# Patient Record
Sex: MALE | Race: WHITE | NOT HISPANIC OR LATINO | URBAN - METROPOLITAN AREA
[De-identification: names, ages, dates, MRNs, and addresses within clinical notes are randomized per-mention and may not be internally consistent; named-entity substitution may affect disease eponyms.]

---

## 2019-02-12 ENCOUNTER — EMERGENCY (EMERGENCY)
Facility: HOSPITAL | Age: 79
LOS: 1 days | Discharge: HOME | End: 2019-02-12
Attending: EMERGENCY MEDICINE | Admitting: EMERGENCY MEDICINE
Payer: MEDICARE

## 2019-02-12 VITALS
SYSTOLIC BLOOD PRESSURE: 118 MMHG | OXYGEN SATURATION: 95 % | HEART RATE: 76 BPM | DIASTOLIC BLOOD PRESSURE: 86 MMHG | TEMPERATURE: 98 F | RESPIRATION RATE: 18 BRPM

## 2019-02-12 VITALS
WEIGHT: 167.99 LBS | HEART RATE: 72 BPM | RESPIRATION RATE: 18 BRPM | DIASTOLIC BLOOD PRESSURE: 61 MMHG | OXYGEN SATURATION: 98 % | TEMPERATURE: 99 F | SYSTOLIC BLOOD PRESSURE: 112 MMHG

## 2019-02-12 DIAGNOSIS — Z79.899 OTHER LONG TERM (CURRENT) DRUG THERAPY: ICD-10-CM

## 2019-02-12 DIAGNOSIS — J11.1 INFLUENZA DUE TO UNIDENTIFIED INFLUENZA VIRUS WITH OTHER RESPIRATORY MANIFESTATIONS: ICD-10-CM

## 2019-02-12 DIAGNOSIS — R68.83 CHILLS (WITHOUT FEVER): ICD-10-CM

## 2019-02-12 PROCEDURE — 87486 CHLMYD PNEUM DNA AMP PROBE: CPT

## 2019-02-12 PROCEDURE — 99283 EMERGENCY DEPT VISIT LOW MDM: CPT

## 2019-02-12 PROCEDURE — 87581 M.PNEUMON DNA AMP PROBE: CPT

## 2019-02-12 PROCEDURE — 71046 X-RAY EXAM CHEST 2 VIEWS: CPT

## 2019-02-12 PROCEDURE — 87633 RESP VIRUS 12-25 TARGETS: CPT

## 2019-02-12 PROCEDURE — 87798 DETECT AGENT NOS DNA AMP: CPT

## 2019-02-12 PROCEDURE — 71046 X-RAY EXAM CHEST 2 VIEWS: CPT | Mod: 26

## 2019-02-12 PROCEDURE — 99283 EMERGENCY DEPT VISIT LOW MDM: CPT | Mod: 25

## 2019-02-12 RX ADMIN — Medication 30 MILLIGRAM(S): at 14:51

## 2019-02-12 NOTE — ED ADULT NURSE NOTE - CHIEF COMPLAINT QUOTE
flu-like symptoms x 3 days.  Wife is here as a patient and has tested positive for flu.  Patient arrived from [facility] 5 days ago

## 2019-02-12 NOTE — ED ADULT NURSE NOTE - OBJECTIVE STATEMENT
78 year old male patient with c/o of cough/cold/body aches x3days.  Wife here @ a patient who is flu positive.  Patient A+OX3, ambulatory with steady gait.  Breathing easily and unlabored.  Recently returned from [place] 5 days ago.  No distress noted.  Pmhx of BPH.  Denies chest pain.  Speaking full sentences wo distress.

## 2019-02-12 NOTE — ED PROVIDER NOTE - ATTENDING CONTRIBUTION TO CARE
79 yo m w hx of BPH presents to ED with concern for URI symptoms x 3 days.  + Cough, body aches.  + Chills.  Denies SOB, no documented fevers, no n/v.  No diarrhea.  No recent travel.  On my face to face ED eval, patient is non toxic in appearance.  HRRR, lungs clear.  Abd soft.  Flu positive.  Will plan for discharge w tamiflu and prompt PCP follow up in 1-2 days for re eval.  Patient aware and agreeable.  He will return to ED should symptoms worsen or if he has any concern prior to this recommended follow up.

## 2019-02-12 NOTE — ED PROVIDER NOTE - CLINICAL SUMMARY MEDICAL DECISION MAKING FREE TEXT BOX
Patient with + influenza afebrile well appearing in ED. Lungs clear. Will tx for influenza and recommend f/u with PMD.

## 2019-02-12 NOTE — ED PROVIDER NOTE - OBJECTIVE STATEMENT
77 y/o m with h/o bph present to ED c/o three days of cough with intermittent sputum, body aches, nasal congestion. Denies fever, chest pain, n,v,d,abd pain, dysuria, hematuria. Wife recently was admitted 77 y/o m with h/o bph present to ED c/o three days of cough with intermittent sputum, body aches, nasal congestion. Denies fever, chest pain, n,v,d,abd pain, dysuria, hematuria. Wife recently was admitted for influenza.

## 2019-02-12 NOTE — ED ADULT NURSE NOTE - CHPI ED NUR SYMPTOMS NEG
no pain/no decreased eating/drinking/no fever/no dizziness/no weakness/no chills/no tingling/no nausea/no vomiting

## 2019-02-12 NOTE — ED ADULT TRIAGE NOTE - CHIEF COMPLAINT QUOTE
flu-like symptoms x 3 days.  Wife is here as a patient and has tested positive for flu.  Patient arrived from [facility] 5 days ago

## 2019-02-12 NOTE — ED PROVIDER NOTE - ENMT, MLM
Airway patent, Nasal mucosa clear, boggy turbinates . Mouth with normal mucosa. Throat has no vesicles, no oropharyngeal exudates and uvula is midline.

## 2020-01-01 NOTE — ED PROVIDER NOTE - PSYCHIATRIC NEGATIVE STATEMENT, MLM
